# Patient Record
Sex: FEMALE | Race: ASIAN | URBAN - METROPOLITAN AREA
[De-identification: names, ages, dates, MRNs, and addresses within clinical notes are randomized per-mention and may not be internally consistent; named-entity substitution may affect disease eponyms.]

---

## 2020-09-09 LAB
ABO + RH BLD: NORMAL
ABO + RH BLD: NORMAL
BLD GP AB SCN SERPL QL: NORMAL
HBV SURFACE AG SERPL QL IA: NORMAL
HIV 1+2 AB+HIV1 P24 AG SERPL QL IA: NORMAL
RUBELLA ABY IGG: NORMAL
TREPONEMA ANTIBODIES: NORMAL

## 2021-03-29 ENCOUNTER — ANESTHESIA EVENT (OUTPATIENT)
Dept: OBGYN | Facility: CLINIC | Age: 31
End: 2021-03-29
Payer: COMMERCIAL

## 2021-03-29 ENCOUNTER — HOSPITAL ENCOUNTER (OUTPATIENT)
Facility: CLINIC | Age: 31
LOS: 1 days | Discharge: HOME | End: 2021-03-29
Attending: OBSTETRICS & GYNECOLOGY | Admitting: OBSTETRICS & GYNECOLOGY
Payer: COMMERCIAL

## 2021-03-29 ENCOUNTER — HOSPITAL ENCOUNTER (INPATIENT)
Facility: CLINIC | Age: 31
LOS: 3 days | Discharge: HOME HEALTH | End: 2021-04-01
Attending: OBSTETRICS & GYNECOLOGY | Admitting: OBSTETRICS & GYNECOLOGY
Payer: COMMERCIAL

## 2021-03-29 ENCOUNTER — ANESTHESIA (OUTPATIENT)
Dept: OBGYN | Facility: CLINIC | Age: 31
End: 2021-03-29
Payer: COMMERCIAL

## 2021-03-29 DIAGNOSIS — Z98.891 S/P C-SECTION: Primary | ICD-10-CM

## 2021-03-29 LAB
ABO + RH BLD: ABNORMAL
ABO + RH BLD: ABNORMAL
BLD GP AB INVEST PLASRBC-IMP: ABNORMAL
BLD GP AB SCN SERPL QL: ABNORMAL
BLOOD BANK CMNT PATIENT-IMP: ABNORMAL
BLOOD BANK CMNT PATIENT-IMP: ABNORMAL
BLOOD BANK CMNT PATIENT-IMP: NORMAL
HGB BLD-MCNC: 11.7 G/DL (ref 11.7–15.7)
LABORATORY COMMENT REPORT: NORMAL
SARS-COV-2 RNA RESP QL NAA+PROBE: NEGATIVE
SPECIMEN EXP DATE BLD: ABNORMAL
SPECIMEN SOURCE: NORMAL
T PALLIDUM AB SER QL: NONREACTIVE

## 2021-03-29 PROCEDURE — 250N000011 HC RX IP 250 OP 636: Performed by: NURSE ANESTHETIST, CERTIFIED REGISTERED

## 2021-03-29 PROCEDURE — 250N000009 HC RX 250: Performed by: OBSTETRICS & GYNECOLOGY

## 2021-03-29 PROCEDURE — 258N000003 HC RX IP 258 OP 636: Performed by: NURSE ANESTHETIST, CERTIFIED REGISTERED

## 2021-03-29 PROCEDURE — 250N000013 HC RX MED GY IP 250 OP 250 PS 637: Performed by: OBSTETRICS & GYNECOLOGY

## 2021-03-29 PROCEDURE — G0463 HOSPITAL OUTPT CLINIC VISIT: HCPCS | Mod: 25

## 2021-03-29 PROCEDURE — 86900 BLOOD TYPING SEROLOGIC ABO: CPT | Performed by: OBSTETRICS & GYNECOLOGY

## 2021-03-29 PROCEDURE — 85018 HEMOGLOBIN: CPT | Performed by: OBSTETRICS & GYNECOLOGY

## 2021-03-29 PROCEDURE — 59025 FETAL NON-STRESS TEST: CPT

## 2021-03-29 PROCEDURE — 250N000011 HC RX IP 250 OP 636: Performed by: OBSTETRICS & GYNECOLOGY

## 2021-03-29 PROCEDURE — 120N000012 HC R&B POSTPARTUM

## 2021-03-29 PROCEDURE — 258N000003 HC RX IP 258 OP 636: Performed by: OBSTETRICS & GYNECOLOGY

## 2021-03-29 PROCEDURE — 86901 BLOOD TYPING SEROLOGIC RH(D): CPT | Performed by: OBSTETRICS & GYNECOLOGY

## 2021-03-29 PROCEDURE — 86870 RBC ANTIBODY IDENTIFICATION: CPT | Performed by: OBSTETRICS & GYNECOLOGY

## 2021-03-29 PROCEDURE — 250N000009 HC RX 250: Performed by: NURSE ANESTHETIST, CERTIFIED REGISTERED

## 2021-03-29 PROCEDURE — 272N000001 HC OR GENERAL SUPPLY STERILE: Performed by: OBSTETRICS & GYNECOLOGY

## 2021-03-29 PROCEDURE — 360N000076 HC SURGERY LEVEL 3, PER MIN: Performed by: OBSTETRICS & GYNECOLOGY

## 2021-03-29 PROCEDURE — 86850 RBC ANTIBODY SCREEN: CPT | Performed by: OBSTETRICS & GYNECOLOGY

## 2021-03-29 PROCEDURE — 87635 SARS-COV-2 COVID-19 AMP PRB: CPT | Performed by: OBSTETRICS & GYNECOLOGY

## 2021-03-29 PROCEDURE — 36415 COLL VENOUS BLD VENIPUNCTURE: CPT | Performed by: OBSTETRICS & GYNECOLOGY

## 2021-03-29 PROCEDURE — 86780 TREPONEMA PALLIDUM: CPT | Performed by: OBSTETRICS & GYNECOLOGY

## 2021-03-29 PROCEDURE — 710N000010 HC RECOVERY PHASE 1, LEVEL 2, PER MIN: Performed by: OBSTETRICS & GYNECOLOGY

## 2021-03-29 PROCEDURE — 370N000017 HC ANESTHESIA TECHNICAL FEE, PER MIN: Performed by: OBSTETRICS & GYNECOLOGY

## 2021-03-29 RX ORDER — TRANEXAMIC ACID 10 MG/ML
1 INJECTION, SOLUTION INTRAVENOUS EVERY 30 MIN PRN
Status: DISCONTINUED | OUTPATIENT
Start: 2021-03-29 | End: 2021-04-01 | Stop reason: HOSPADM

## 2021-03-29 RX ORDER — OXYTOCIN/0.9 % SODIUM CHLORIDE 30/500 ML
100 PLASTIC BAG, INJECTION (ML) INTRAVENOUS CONTINUOUS
Status: DISCONTINUED | OUTPATIENT
Start: 2021-03-29 | End: 2021-04-01 | Stop reason: HOSPADM

## 2021-03-29 RX ORDER — SIMETHICONE 80 MG
80 TABLET,CHEWABLE ORAL 4 TIMES DAILY PRN
Status: DISCONTINUED | OUTPATIENT
Start: 2021-03-29 | End: 2021-04-01 | Stop reason: HOSPADM

## 2021-03-29 RX ORDER — KETOROLAC TROMETHAMINE 30 MG/ML
INJECTION, SOLUTION INTRAMUSCULAR; INTRAVENOUS PRN
Status: DISCONTINUED | OUTPATIENT
Start: 2021-03-29 | End: 2021-03-29

## 2021-03-29 RX ORDER — AZITHROMYCIN 500 MG/1
500 INJECTION, POWDER, LYOPHILIZED, FOR SOLUTION INTRAVENOUS
Status: COMPLETED | OUTPATIENT
Start: 2021-03-29 | End: 2021-03-29

## 2021-03-29 RX ORDER — ACETAMINOPHEN 325 MG/1
975 TABLET ORAL ONCE
Status: COMPLETED | OUTPATIENT
Start: 2021-03-29 | End: 2021-03-29

## 2021-03-29 RX ORDER — AMOXICILLIN 250 MG
1 CAPSULE ORAL 2 TIMES DAILY
Status: DISCONTINUED | OUTPATIENT
Start: 2021-03-29 | End: 2021-04-01 | Stop reason: HOSPADM

## 2021-03-29 RX ORDER — IBUPROFEN 400 MG/1
800 TABLET, FILM COATED ORAL EVERY 6 HOURS
Status: DISCONTINUED | OUTPATIENT
Start: 2021-03-30 | End: 2021-04-01 | Stop reason: HOSPADM

## 2021-03-29 RX ORDER — ONDANSETRON 2 MG/ML
INJECTION INTRAMUSCULAR; INTRAVENOUS PRN
Status: DISCONTINUED | OUTPATIENT
Start: 2021-03-29 | End: 2021-03-29

## 2021-03-29 RX ORDER — LEVOTHYROXINE SODIUM 137 UG/1
137 TABLET ORAL 2 TIMES DAILY
Status: DISCONTINUED | OUTPATIENT
Start: 2021-03-29 | End: 2021-03-29

## 2021-03-29 RX ORDER — FERROUS SULFATE 324(65)MG
TABLET, DELAYED RELEASE (ENTERIC COATED) ORAL
Status: ON HOLD | COMMUNITY
End: 2021-03-29

## 2021-03-29 RX ORDER — NALOXONE HYDROCHLORIDE 0.4 MG/ML
0.2 INJECTION, SOLUTION INTRAMUSCULAR; INTRAVENOUS; SUBCUTANEOUS
Status: DISCONTINUED | OUTPATIENT
Start: 2021-03-29 | End: 2021-04-01 | Stop reason: HOSPADM

## 2021-03-29 RX ORDER — FENTANYL CITRATE 50 UG/ML
25 INJECTION, SOLUTION INTRAMUSCULAR; INTRAVENOUS
Status: DISCONTINUED | OUTPATIENT
Start: 2021-03-29 | End: 2021-03-29 | Stop reason: HOSPADM

## 2021-03-29 RX ORDER — HYDROCORTISONE 2.5 %
CREAM (GRAM) TOPICAL 3 TIMES DAILY PRN
Status: DISCONTINUED | OUTPATIENT
Start: 2021-03-29 | End: 2021-04-01 | Stop reason: HOSPADM

## 2021-03-29 RX ORDER — AZITHROMYCIN 500 MG/1
INJECTION, POWDER, LYOPHILIZED, FOR SOLUTION INTRAVENOUS
Status: DISCONTINUED
Start: 2021-03-29 | End: 2021-03-29 | Stop reason: HOSPADM

## 2021-03-29 RX ORDER — CITRIC ACID/SODIUM CITRATE 334-500MG
30 SOLUTION, ORAL ORAL
Status: COMPLETED | OUTPATIENT
Start: 2021-03-29 | End: 2021-03-29

## 2021-03-29 RX ORDER — MODIFIED LANOLIN
OINTMENT (GRAM) TOPICAL
Status: DISCONTINUED | OUTPATIENT
Start: 2021-03-29 | End: 2021-04-01 | Stop reason: HOSPADM

## 2021-03-29 RX ORDER — SODIUM CHLORIDE, SODIUM LACTATE, POTASSIUM CHLORIDE, CALCIUM CHLORIDE 600; 310; 30; 20 MG/100ML; MG/100ML; MG/100ML; MG/100ML
INJECTION, SOLUTION INTRAVENOUS CONTINUOUS PRN
Status: DISCONTINUED | OUTPATIENT
Start: 2021-03-29 | End: 2021-03-29

## 2021-03-29 RX ORDER — METHYLERGONOVINE MALEATE 0.2 MG/ML
200 INJECTION INTRAVENOUS
Status: DISCONTINUED | OUTPATIENT
Start: 2021-03-29 | End: 2021-04-01 | Stop reason: HOSPADM

## 2021-03-29 RX ORDER — NALBUPHINE HYDROCHLORIDE 10 MG/ML
2.5-5 INJECTION, SOLUTION INTRAMUSCULAR; INTRAVENOUS; SUBCUTANEOUS EVERY 6 HOURS PRN
Status: DISCONTINUED | OUTPATIENT
Start: 2021-03-29 | End: 2021-04-01 | Stop reason: HOSPADM

## 2021-03-29 RX ORDER — SODIUM CHLORIDE, SODIUM LACTATE, POTASSIUM CHLORIDE, CALCIUM CHLORIDE 600; 310; 30; 20 MG/100ML; MG/100ML; MG/100ML; MG/100ML
INJECTION, SOLUTION INTRAVENOUS CONTINUOUS
Status: DISCONTINUED | OUTPATIENT
Start: 2021-03-29 | End: 2021-03-29 | Stop reason: HOSPADM

## 2021-03-29 RX ORDER — OXYTOCIN/0.9 % SODIUM CHLORIDE 30/500 ML
340 PLASTIC BAG, INJECTION (ML) INTRAVENOUS CONTINUOUS PRN
Status: DISCONTINUED | OUTPATIENT
Start: 2021-03-29 | End: 2021-04-01 | Stop reason: HOSPADM

## 2021-03-29 RX ORDER — LIDOCAINE 40 MG/G
CREAM TOPICAL
Status: DISCONTINUED | OUTPATIENT
Start: 2021-03-29 | End: 2021-04-01 | Stop reason: HOSPADM

## 2021-03-29 RX ORDER — LEVOTHYROXINE SODIUM 137 UG/1
137 TABLET ORAL DAILY
COMMUNITY

## 2021-03-29 RX ORDER — AMOXICILLIN 250 MG
2 CAPSULE ORAL 2 TIMES DAILY
Status: DISCONTINUED | OUTPATIENT
Start: 2021-03-29 | End: 2021-04-01 | Stop reason: HOSPADM

## 2021-03-29 RX ORDER — MISOPROSTOL 200 UG/1
800 TABLET ORAL
Status: DISCONTINUED | OUTPATIENT
Start: 2021-03-29 | End: 2021-04-01 | Stop reason: HOSPADM

## 2021-03-29 RX ORDER — ACETAMINOPHEN 325 MG/1
TABLET ORAL
Status: DISCONTINUED
Start: 2021-03-29 | End: 2021-03-29 | Stop reason: HOSPADM

## 2021-03-29 RX ORDER — ONDANSETRON 2 MG/ML
4 INJECTION INTRAMUSCULAR; INTRAVENOUS EVERY 6 HOURS PRN
Status: DISCONTINUED | OUTPATIENT
Start: 2021-03-29 | End: 2021-03-29 | Stop reason: HOSPADM

## 2021-03-29 RX ORDER — BUPIVACAINE HYDROCHLORIDE 7.5 MG/ML
INJECTION, SOLUTION INTRASPINAL PRN
Status: DISCONTINUED | OUTPATIENT
Start: 2021-03-29 | End: 2021-03-29

## 2021-03-29 RX ORDER — NALOXONE HYDROCHLORIDE 0.4 MG/ML
0.4 INJECTION, SOLUTION INTRAMUSCULAR; INTRAVENOUS; SUBCUTANEOUS
Status: DISCONTINUED | OUTPATIENT
Start: 2021-03-29 | End: 2021-04-01 | Stop reason: HOSPADM

## 2021-03-29 RX ORDER — CEFAZOLIN SODIUM 2 G/100ML
INJECTION, SOLUTION INTRAVENOUS
Status: DISCONTINUED
Start: 2021-03-29 | End: 2021-03-29 | Stop reason: HOSPADM

## 2021-03-29 RX ORDER — MORPHINE SULFATE 1 MG/ML
INJECTION, SOLUTION EPIDURAL; INTRATHECAL; INTRAVENOUS PRN
Status: DISCONTINUED | OUTPATIENT
Start: 2021-03-29 | End: 2021-03-29

## 2021-03-29 RX ORDER — KETOROLAC TROMETHAMINE 30 MG/ML
30 INJECTION, SOLUTION INTRAMUSCULAR; INTRAVENOUS EVERY 6 HOURS
Status: COMPLETED | OUTPATIENT
Start: 2021-03-29 | End: 2021-03-30

## 2021-03-29 RX ORDER — CEFAZOLIN SODIUM 2 G/100ML
2 INJECTION, SOLUTION INTRAVENOUS
Status: COMPLETED | OUTPATIENT
Start: 2021-03-29 | End: 2021-03-29

## 2021-03-29 RX ORDER — OXYCODONE HYDROCHLORIDE 5 MG/1
5 TABLET ORAL EVERY 4 HOURS PRN
Status: DISCONTINUED | OUTPATIENT
Start: 2021-03-29 | End: 2021-04-01 | Stop reason: HOSPADM

## 2021-03-29 RX ORDER — LIDOCAINE 40 MG/G
CREAM TOPICAL
Status: DISCONTINUED | OUTPATIENT
Start: 2021-03-29 | End: 2021-03-29

## 2021-03-29 RX ORDER — ACETAMINOPHEN 325 MG/1
975 TABLET ORAL EVERY 6 HOURS
Status: DISCONTINUED | OUTPATIENT
Start: 2021-03-29 | End: 2021-04-01 | Stop reason: HOSPADM

## 2021-03-29 RX ORDER — EPHEDRINE SULFATE 50 MG/ML
5 INJECTION, SOLUTION INTRAMUSCULAR; INTRAVENOUS; SUBCUTANEOUS
Status: DISCONTINUED | OUTPATIENT
Start: 2021-03-29 | End: 2021-04-01 | Stop reason: HOSPADM

## 2021-03-29 RX ORDER — ONDANSETRON 2 MG/ML
4 INJECTION INTRAMUSCULAR; INTRAVENOUS EVERY 6 HOURS PRN
Status: DISCONTINUED | OUTPATIENT
Start: 2021-03-29 | End: 2021-04-01 | Stop reason: HOSPADM

## 2021-03-29 RX ORDER — OXYTOCIN 10 [USP'U]/ML
10 INJECTION, SOLUTION INTRAMUSCULAR; INTRAVENOUS
Status: DISCONTINUED | OUTPATIENT
Start: 2021-03-29 | End: 2021-04-01 | Stop reason: HOSPADM

## 2021-03-29 RX ORDER — BISACODYL 10 MG
10 SUPPOSITORY, RECTAL RECTAL DAILY PRN
Status: DISCONTINUED | OUTPATIENT
Start: 2021-03-31 | End: 2021-04-01 | Stop reason: HOSPADM

## 2021-03-29 RX ORDER — OXYTOCIN/0.9 % SODIUM CHLORIDE 30/500 ML
PLASTIC BAG, INJECTION (ML) INTRAVENOUS CONTINUOUS PRN
Status: DISCONTINUED | OUTPATIENT
Start: 2021-03-29 | End: 2021-03-29

## 2021-03-29 RX ORDER — VITAMIN A ACETATE, .BETA.-CAROTENE, ASCORBIC ACID, CHOLECALCIFEROL, .ALPHA.-TOCOPHEROL ACETATE, DL-, THIAMINE MONONITRATE, RIBOFLAVIN, NIACINAMIDE, PYRIDOXINE HYDROCHLORIDE, FOLIC ACID, CYANOCOBALAMIN, CALCIUM CARBONATE, FERROUS FUMARATE, ZINC OXIDE, AND CUPRIC OXIDE 2000; 2000; 120; 400; 22; 1.84; 3; 20; 10; 1; 12; 200; 27; 25; 2 [IU]/1; [IU]/1; MG/1; [IU]/1; MG/1; MG/1; MG/1; MG/1; MG/1; MG/1; UG/1; MG/1; MG/1; MG/1; MG/1
1 TABLET ORAL DAILY
Status: ON HOLD | COMMUNITY
End: 2021-03-29

## 2021-03-29 RX ORDER — CITRIC ACID/SODIUM CITRATE 334-500MG
SOLUTION, ORAL ORAL
Status: DISCONTINUED
Start: 2021-03-29 | End: 2021-03-29 | Stop reason: HOSPADM

## 2021-03-29 RX ORDER — DEXTROSE, SODIUM CHLORIDE, SODIUM LACTATE, POTASSIUM CHLORIDE, AND CALCIUM CHLORIDE 5; .6; .31; .03; .02 G/100ML; G/100ML; G/100ML; G/100ML; G/100ML
INJECTION, SOLUTION INTRAVENOUS CONTINUOUS
Status: DISCONTINUED | OUTPATIENT
Start: 2021-03-29 | End: 2021-04-01 | Stop reason: HOSPADM

## 2021-03-29 RX ORDER — LEVOTHYROXINE SODIUM 137 UG/1
137 TABLET ORAL
Status: DISCONTINUED | OUTPATIENT
Start: 2021-03-29 | End: 2021-04-01 | Stop reason: HOSPADM

## 2021-03-29 RX ORDER — CEFAZOLIN SODIUM 1 G/3ML
1 INJECTION, POWDER, FOR SOLUTION INTRAMUSCULAR; INTRAVENOUS SEE ADMIN INSTRUCTIONS
Status: DISCONTINUED | OUTPATIENT
Start: 2021-03-29 | End: 2021-03-29 | Stop reason: HOSPADM

## 2021-03-29 RX ADMIN — SODIUM CHLORIDE, POTASSIUM CHLORIDE, SODIUM LACTATE AND CALCIUM CHLORIDE: 600; 310; 30; 20 INJECTION, SOLUTION INTRAVENOUS at 08:00

## 2021-03-29 RX ADMIN — MORPHINE SULFATE 0.1 MG: 1 INJECTION, SOLUTION EPIDURAL; INTRATHECAL; INTRAVENOUS at 09:21

## 2021-03-29 RX ADMIN — KETOROLAC TROMETHAMINE 30 MG: 30 INJECTION, SOLUTION INTRAMUSCULAR at 16:04

## 2021-03-29 RX ADMIN — SODIUM CHLORIDE, POTASSIUM CHLORIDE, SODIUM LACTATE AND CALCIUM CHLORIDE: 600; 310; 30; 20 INJECTION, SOLUTION INTRAVENOUS at 10:04

## 2021-03-29 RX ADMIN — PHENYLEPHRINE HYDROCHLORIDE 0.5 MCG/KG/MIN: 10 INJECTION INTRAVENOUS at 09:19

## 2021-03-29 RX ADMIN — ONDANSETRON 4 MG: 2 INJECTION INTRAMUSCULAR; INTRAVENOUS at 22:42

## 2021-03-29 RX ADMIN — ACETAMINOPHEN 975 MG: 325 TABLET, FILM COATED ORAL at 14:10

## 2021-03-29 RX ADMIN — SODIUM CITRATE AND CITRIC ACID MONOHYDRATE 30 ML: 500; 334 SOLUTION ORAL at 09:11

## 2021-03-29 RX ADMIN — BUPIVACAINE HYDROCHLORIDE IN DEXTROSE 10.5 MG: 7.5 INJECTION, SOLUTION SUBARACHNOID at 09:21

## 2021-03-29 RX ADMIN — CEFAZOLIN SODIUM 2 G: 2 INJECTION, SOLUTION INTRAVENOUS at 09:32

## 2021-03-29 RX ADMIN — PHENYLEPHRINE HYDROCHLORIDE 100 MCG: 10 INJECTION INTRAVENOUS at 09:20

## 2021-03-29 RX ADMIN — ONDANSETRON 4 MG: 2 INJECTION INTRAMUSCULAR; INTRAVENOUS at 16:03

## 2021-03-29 RX ADMIN — AZITHROMYCIN MONOHYDRATE 500 MG: 500 INJECTION, POWDER, LYOPHILIZED, FOR SOLUTION INTRAVENOUS at 09:30

## 2021-03-29 RX ADMIN — ACETAMINOPHEN 975 MG: 325 TABLET, FILM COATED ORAL at 08:21

## 2021-03-29 RX ADMIN — KETOROLAC TROMETHAMINE 30 MG: 30 INJECTION, SOLUTION INTRAMUSCULAR at 10:05

## 2021-03-29 RX ADMIN — ONDANSETRON 4 MG: 2 INJECTION INTRAMUSCULAR; INTRAVENOUS at 09:09

## 2021-03-29 RX ADMIN — SODIUM CHLORIDE, SODIUM LACTATE, POTASSIUM CHLORIDE, CALCIUM CHLORIDE AND DEXTROSE MONOHYDRATE: 5; 600; 310; 30; 20 INJECTION, SOLUTION INTRAVENOUS at 19:59

## 2021-03-29 RX ADMIN — SENNOSIDES AND DOCUSATE SODIUM 1 TABLET: 8.6; 5 TABLET ORAL at 14:09

## 2021-03-29 RX ADMIN — KETOROLAC TROMETHAMINE 30 MG: 30 INJECTION, SOLUTION INTRAMUSCULAR at 21:54

## 2021-03-29 RX ADMIN — ACETAMINOPHEN 975 MG: 325 TABLET, FILM COATED ORAL at 19:58

## 2021-03-29 RX ADMIN — Medication 100 ML/HR: at 14:07

## 2021-03-29 RX ADMIN — Medication 340 ML/HR: at 09:39

## 2021-03-29 RX ADMIN — SENNOSIDES AND DOCUSATE SODIUM 1 TABLET: 8.6; 5 TABLET ORAL at 19:58

## 2021-03-29 RX ADMIN — SODIUM CHLORIDE, POTASSIUM CHLORIDE, SODIUM LACTATE AND CALCIUM CHLORIDE: 600; 310; 30; 20 INJECTION, SOLUTION INTRAVENOUS at 09:14

## 2021-03-29 ASSESSMENT — MIFFLIN-ST. JEOR: SCORE: 1470.65

## 2021-03-29 ASSESSMENT — ENCOUNTER SYMPTOMS: SEIZURES: 0

## 2021-03-29 ASSESSMENT — LIFESTYLE VARIABLES: TOBACCO_USE: 0

## 2021-03-29 NOTE — ANESTHESIA PREPROCEDURE EVALUATION
Anesthesia Pre-Procedure Evaluation    Patient: Valerie Burgess   MRN: [id number] : 1990        Preoperative Diagnosis: Previous  delivery affecting pregnancy, antepartum [O34.219]   Procedure : Procedure(s):  REPEAT  SECTION     Past Medical History:   Diagnosis Date     Thyroid disease       Past Surgical History:   Procedure Laterality Date     HERNIA REPAIR        No Known Allergies   Social History     Tobacco Use     Smoking status: Never Smoker     Smokeless tobacco: Never Used   Substance Use Topics     Alcohol use: Not Currently      Wt Readings from Last 1 Encounters:   21 77.6 kg (171 lb)        Anesthesia Evaluation   Pt has not had prior anesthetic         ROS/MED HX  ENT/Pulmonary:    (-) tobacco use, asthma and sleep apnea   Neurologic:    (-) no seizures and no CVA   Cardiovascular:       METS/Exercise Tolerance:     Hematologic:       Musculoskeletal:       GI/Hepatic:    (-) GERD   Renal/Genitourinary:       Endo:     (+) thyroid problem,     Psychiatric/Substance Use:       Infectious Disease:       Malignancy:       Other:            Physical Exam    Airway        Mallampati: II   TM distance: > 3 FB   Neck ROM: full   Mouth opening: > 3 cm    Respiratory Devices and Support         Dental  no notable dental history         Cardiovascular   cardiovascular exam normal          Pulmonary   pulmonary exam normal                OUTSIDE LABS:  CBC: No results found for: WBC, HGB, HCT, PLT  BMP: No results found for: NA, POTASSIUM, CHLORIDE, CO2, BUN, CR, GLC  COAGS: No results found for: PTT, INR, FIBR  POC: No results found for: BGM, HCG, HCGS  HEPATIC: No results found for: ALBUMIN, PROTTOTAL, ALT, AST, GGT, ALKPHOS, BILITOTAL, BILIDIRECT, EUGENIE  OTHER: No results found for: PH, LACT, A1C, SHARI, PHOS, MAG, LIPASE, AMYLASE, TSH, T4, T3, CRP, SED    Anesthesia Plan    ASA Status:  2      Anesthesia Type: Spinal.              Consents    Anesthesia Plan(s) and associated  risks, benefits, and realistic alternatives discussed. Questions answered and patient/representative(s) expressed understanding.     - Discussed with:  Patient    Use of blood products discussed: Yes.     Postoperative Care    Pain management: intrathecal morphine, IV analgesics.   PONV prophylaxis: Ondansetron (or other 5HT-3)     Comments:         H&P reviewed: Unable to attach H&P to encounter due to EHR limitations. H&P Update: appropriate H&P reviewed, patient examined. No interval changes since H&P (within 30 days).         Kayy Issa

## 2021-03-29 NOTE — PLAN OF CARE
Vital signs are WNL and patient was able to take oral meds.  Bowel sounds present but hypo.  Lung sounds clear.  She rates pain as a 4/10 and describes it as pressure.  She and spouse are working on infant cares and breastfeeding.

## 2021-03-29 NOTE — ANESTHESIA POSTPROCEDURE EVALUATION
Patient: Valerie Burgess    Procedure(s):  REPEAT  SECTION    Diagnosis:Previous  delivery affecting pregnancy, antepartum [O34.219]  Diagnosis Additional Information: No value filed.    Anesthesia Type:  Spinal    Note:  Disposition: Inpatient   Postop Pain Control: Uneventful            Sign Out: Well controlled pain   PONV: No   Neuro/Psych: Uneventful            Sign Out: Acceptable/Baseline neuro status   Airway/Respiratory: Uneventful            Sign Out: Acceptable/Baseline resp. status   CV/Hemodynamics: Uneventful            Sign Out: Acceptable CV status   Other NRE: NONE   DID A NON-ROUTINE EVENT OCCUR?          Last vitals:  Vitals:    21 1100 21 1115 21 1130   BP: 101/66 91/55 93/64   Pulse: 71 79 70   Resp: 14 14 14   Temp:  36.3  C (97.3  F)    SpO2: 97% 97% 99%       Last vitals prior to Anesthesia Care Transfer:  CRNA VITALS  3/29/2021 0934 - 3/29/2021 1034      3/29/2021             Resp Rate (set):  10          Electronically Signed By: Kayy Issa  2021  11:48 AM

## 2021-03-29 NOTE — PLAN OF CARE
Data: [facility] with c/o labor ctx every 5 minutes.   Patient is a . Prenatal record reviewed.      OB History    Para Term  AB Living   2 1 0 0 0 1   SAB TAB Ectopic Multiple Live Births   0 0 0 0 1      # Outcome Date GA Lbr Nikhil/2nd Weight Sex Delivery Anes PTL Lv   2 Current            1 Para 16     CS-LTranv   YARA      Medical History:   Past Medical History:   Diagnosis Date     Thyroid disease      Gestational Age 37w0d. VSS. Cervix: posterior, closed and effaced 50-70%.  Fetal movement present. Patient denies cramping, backache, vaginal discharge, pelvic pressure, UTI symptoms, GI problems, bloody show, vaginal bleeding, edema, headache, visual disturbances, epigastric or URQ pain, abdominal pain, rupture of membranes. Support person present.    Action: Verbal consent for EFM. Triage assessment completed. EFM applied. Fetal assessment: Presumed adequate fetal oxygenation documented (see flow record).  Patient instructed to report change in fetal movement, vaginal leaking of fluid or bleeding, abdominal pain, or any concerns related to the pregnancy to her nurse/physician.     Response: Dr. Aguila informed of SVE, FHR strip reviewed. Plan per provider is Discharge home. Patient verbalized understanding of education and verbalized agreement with plan. Discharged ambulatory at 0352.

## 2021-03-29 NOTE — H&P
"[facility]   LABOR ADMIT    Valerie Burgess MRN# [id number]  Age: 31 year old YOB: 1990    Date of Admission: 3/29/2021    Primary care provider: No primary care provider on file.     HPI: This is a 31 year old  at 36w4d presenting in labor. Her pregnancy has been overall uncomplicated. She is on synthroid for hypothyroidism. She had low risk results of NIPT. She has a history of  in her first pregnancy. Also with history of an umbilical hernia repair with mesh.   She began neyda overnight. Initially her cervix was closed upon presentation to [facility].     Obstetrical History:  H/o term  due to NRFHT in setting of triple I when 6 cm    Past Medical History:  Hypothyroidism on synthroid     Past Surgical History:       Social History:  This patient has no significant social history     Family History:  This patient has no significant family history     Allergies:  All allergies reviewed and addressed    Physical Exam:  /69   Pulse 81   Temp 97.8  F (36.6  C) (Temporal)   Resp 16   Ht 1.626 m (5' 4\")   Wt 77.6 kg (171 lb)   BMI 29.35 kg/m    Gen: NAD  Abd: soft, NT, ND, gravid  Ext: NT, nonedematous  SVE: 3 cm around 0730 - change from closed at 0330    Cat I FHT  Neyda q3-5 mins    Assessment:  This is a 31 year old  at 36+4 weeks admitted for RLTCS due to labor     Plan:  Admitted to periop  preop abx  Reviewed risks of  including bleeding, infection, injury to surrounding structures, need for reoperation, need for blood transfusion. She consented to RLTCS and also signed blood transfusion consent.   Reviewed that baby is a few days  (pt initially incorrectly reported her due date as , which was her scheduled RLTCS date, but her CAROELE is  based on first trimester ultrasound c/w LMP) but that in the setting of labor at this point, delivery is recommended. Advised babies generally do well at this age.  Routine " postpartum care  Continue synthroid       Heavenly Aguila MD  3/29/2021 3898

## 2021-03-29 NOTE — ANESTHESIA PROCEDURE NOTES
Intrathecal injection Procedure Note  Pre-Procedure   Staff -        Anesthesiologist:  Kayy Issa       Performed By: anesthesiologist       Location: OB       Pre-Anesthestic Checklist: patient identified, IV checked, risks and benefits discussed, informed consent, monitors and equipment checked, pre-op evaluation, at physician/surgeon's request and post-op pain management  Timeout:       Correct Patient: Yes        Correct Procedure: Yes        Correct Site: Yes        Correct Position: Yes   Procedure Documentation  Procedure: intrathecal injection       Patient Position: sitting       Skin prep: Betadine       Insertion Site: L3-4. (midline approach).       Needle Gauge: 25.        Needle Length (Inches): 3        Spinal Needle Type: Quincke       Introducer used      # of attempts: 1 and  # of redirects:     Assessment/Narrative         Paresthesias: No.       CSF fluid: clear.      Opening pressure was cmH2O while  Sitting.      Comments:  duramorph 100mcg pf  bupivicaine 0.75% hyperbaric, 10.5mg

## 2021-03-29 NOTE — LACTATION NOTE
Visited with family for initial lactation visit. Parents report infant has been attempting to breast feed and then supplementing with formula via bottle. Infant spitty at times. Attempted to help infant latch on right breast with nipple shield; infant too sleepy to latch. Mother able to hand express drops of colostrum. Discussed normal  feeding patterns for LPT infant. Discussed that it may take infant up until her due date to take full feedings at breast. Discussed importance of offering breast when cueing, waiting no longer than 3 hours in between feedings. Recommended benefit of continuing supplement until mature milk is in and outpatient visits determine infant is transferring adequate volumes. Encouraged outpatient lactation visit after mature milk in to evaluate progress. Current feeding plan:  Offer breast when infant cueing/at least every 3 hours. May use nipple shield to help with latch; if no latch after 10 minutes, move on to supplement.   Supplement infant after breastfeeding/attempt: Parents choose to offer formula supplement via bottle.  Mother to pump after every breastfeeding session/attempt.   Explained benefits of holding baby skin to skin often. Will revisit as needed.

## 2021-03-29 NOTE — ANESTHESIA CARE TRANSFER NOTE
Patient: Valerie Burgess    Procedure(s):  REPEAT  SECTION    Diagnosis: Previous  delivery affecting pregnancy, antepartum [O34.219]  Diagnosis Additional Information: No value filed.    Anesthesia Type:   Spinal     Note:    Oropharynx: oropharynx clear of all foreign objects and spontaneously breathing  Level of Consciousness: awake  Oxygen Supplementation: room air    Independent Airway: airway patency satisfactory and stable  Dentition: dentition unchanged  Vital Signs Stable: post-procedure vital signs reviewed and stable  Report to RN Given: handoff report given  Patient transferred to: PACU  Comments: Pt awake and able to verbalize needs. VSS. All monitors on and audible. Spontaneous respiration without difficulty. o2 sats 98% on RA. Pt denies pain. Report given to PACU RN.  Handoff Report: Identifed the Patient, Identified the Reponsible Provider, Reviewed the pertinent medical history, Discussed the surgical course, Reviewed Intra-OP anesthesia mangement and issues during anesthesia, Set expectations for post-procedure period and Allowed opportunity for questions and acknowledgement of understanding      Vitals: (Last set prior to Anesthesia Care Transfer)  CRNA VITALS  3/29/2021 0934 - 3/29/2021 1010      3/29/2021             Resp Rate (set):  10        Electronically Signed By: LAURA Gaitan CRNA  2021  10:10 AM

## 2021-03-29 NOTE — OP NOTE
OB  Delivery Note    Valerie Burgess MRN# [id number]   Age: 31 year old YOB: 1990     Pre-operative Diagnosis:   1. IUP at 36w4d   2. History of  section  3. Desires repeat  section  4.   5.  labor    Post-operative Diagnosis: Same    Procedure done: RLS    Surgeon: Heavenly Aguila MD     Assist: LOUISE Perry    Anesthesia: spinal    Complications:  None    QBL: 315 mL    UOP: 450 mL     IV fluids: 1000 mL    Drains: reynolds catheter    Findings:  Viable female infant in vertex position weighing 6 lbs 0 oz with Apgars of 8/8  3-V cord  clear amniotic fluid  Normal-appearing placenta  Normal tubes and ovaries bilaterally.    Specimens:  none    Complications:  none      Indication and Consent:  This is a 31 year old  admitted at 36w4d for  labor. She has a history of  and desires repeat . The risks of  section including bleeding, infection, injury to surrounding structures, injury to the baby, need for reoperation, need for blood transfusion were discussed with the patient. She expressed understanding and consented to proceed with RLTCS     Procedure Details:    The patient was brought to the operating room with IV fluids running. IV antibiotics were given for infection prophylaxis. PCBs were placed on the lower extremities and found to be working prior to onset of the case. Spinal anesthesia was administered and found to be adequate. A Reynolds catheter was placed to gravity.    The patient was prepped and draped in the dorsal supine position with a leftward tilt. A timeout was performed to identify the patient and procedure.    A Pfannenstiel incision was made at the level of the prior incisional scar with the scalpel. It was carried down through the subcutaneous tissue to the fascia with the scalpel and the Bovie.     The fascia was incised with the scalpel and the fascial incision was extended laterally with the Vee  scissors. The superior aspect of the fascia was grasped with Kocher clamps and  off the underlying musculature with fingertips bluntly laterally and with Vee scissors down the midline. The inferior aspect of the fascia was similarly grasped and dissected off the underlying musculature.  Preperitoneal fatty tissue was  off with fingertips until the peritoneum could be entered bluntly with fingertips. The peritoneal incision was extended laterally with blunt traction with careful visualization of the bladder.  The bladder blade was inserted to keep the bladder out of the operative field.     A bladder flap was developed with use of the Metzenbaum scissors. The bladder blade was repositioned to keep the bladder out of the operative field.     The uterus was palpated and felt to be midline. The scalpel was used to make a transverse incision in the lower uterine segment with care to avoid the bladder. The incision was entered bluntly with fingertips and extended laterally with cephalo-caudad traction.     The infant was found to be in vertex presentation. The head of the fetus was elevated to the incision. Fundal pressure was applied from above and the infant delivered without difficulty. The cord was clamped and cut and the infant after one minute and was passed off to the pediatrics team. The placenta was delivered with manual traction.     The uterus was then exteriorized. The inside of the uterus was wiped with a lap sponge to ensure removal of placental membranes. The hysterotomy was closed with 0 Vicryl in a running locked fashion. Hemostasis was achieved.     The bladder blade was removed. The uterus was replaced in the pelvis. The bladder blade was replaced. Lap sponges were used to remove blood and clot from the pelvic gutters. The hysterotomy was again visualized and found to have good hemostasis. The bladder blade was removed.     The fascia was closed with running sutures of 0 Vicryl. The  incision was irrigated with warm normal saline. The skin was closed in a subcuticular fashion with 4-0 Monocryl.    The patient tolerated the procedure well. All counts were correct x2. The patient and the baby were returned to the recovery room in a stable condition.            Heavenly Aguila MD  3/29/2021 0957

## 2021-03-29 NOTE — PLAN OF CARE
Data: Valerie Burgess transferred to 435 via bed at 1215. Baby transferred via parent's arms.  Action: Receiving unit notified of transfer: Yes. Patient and family notified of room change. Report given to Brittany JONES RN at 1215. Belongings sent to receiving unit. Accompanied by Registered Nurse. Oriented patient to surroundings. Call light within reach. ID bands double-checked with receiving RN.  Response: Patient tolerated transfer and is stable.

## 2021-03-29 NOTE — DISCHARGE INSTRUCTIONS
Discharge Instruction for Undelivered Patients      You were seen for: Labor Assessment  We Consulted: Dr. Aguila  You had (Test or Medicine): NST     Diet:   You may eat meals and snacks.     Activity:  Call your doctor or nurse midwife if your baby is moving less than usual.     Call your provider if you notice:  Swelling in your face or increased swelling in your hands or legs.  Headaches that are not relieved by Tylenol (acetaminophen).  Changes in your vision (blurring: seeing spots or stars.)  Nausea (sick to your stomach) and vomiting (throwing up).   Weight gain of 5 pounds or more per week.  Heartburn that doesn't go away.  Signs of bladder infection: pain when you urinate (use the toilet), need to go more often and more urgently.  The bag of gallagher (rupture of membranes) breaks, or you notice leaking in your underwear.  Bright red blood in your underwear.  Abdominal (lower belly) or stomach pain.  Second (plus) baby: Contractions (tightening) less than 10 minutes apart and getting stronger.      Follow-up:  As scheduled in the clinic

## 2021-03-29 NOTE — PROVIDER NOTIFICATION
Nurse called  To ask for a Rhogam Study order since patient is B-.  There was also an error on her MAR stating that pt gets synthroid BID instead of one daily.  Dr. Aguila stated she will change order.

## 2021-03-30 LAB
HGB BLD-MCNC: 9.9 G/DL (ref 11.7–15.7)
HGB F BLD QL KLEIH BETKE: NORMAL

## 2021-03-30 PROCEDURE — 85018 HEMOGLOBIN: CPT | Performed by: OBSTETRICS & GYNECOLOGY

## 2021-03-30 PROCEDURE — 250N000013 HC RX MED GY IP 250 OP 250 PS 637: Performed by: OBSTETRICS & GYNECOLOGY

## 2021-03-30 PROCEDURE — 250N000011 HC RX IP 250 OP 636: Performed by: OBSTETRICS & GYNECOLOGY

## 2021-03-30 PROCEDURE — 36415 COLL VENOUS BLD VENIPUNCTURE: CPT | Performed by: OBSTETRICS & GYNECOLOGY

## 2021-03-30 PROCEDURE — 85460 HEMOGLOBIN FETAL: CPT | Performed by: OBSTETRICS & GYNECOLOGY

## 2021-03-30 PROCEDURE — 86900 BLOOD TYPING SEROLOGIC ABO: CPT | Performed by: OBSTETRICS & GYNECOLOGY

## 2021-03-30 PROCEDURE — 85461 HEMOGLOBIN FETAL: CPT | Performed by: OBSTETRICS & GYNECOLOGY

## 2021-03-30 PROCEDURE — 120N000012 HC R&B POSTPARTUM

## 2021-03-30 PROCEDURE — 86901 BLOOD TYPING SEROLOGIC RH(D): CPT | Performed by: OBSTETRICS & GYNECOLOGY

## 2021-03-30 RX ADMIN — ACETAMINOPHEN 975 MG: 325 TABLET, FILM COATED ORAL at 14:11

## 2021-03-30 RX ADMIN — HUMAN RHO(D) IMMUNE GLOBULIN 600 MCG: 300 INJECTION, SOLUTION INTRAMUSCULAR at 20:16

## 2021-03-30 RX ADMIN — IBUPROFEN 800 MG: 400 TABLET ORAL at 17:12

## 2021-03-30 RX ADMIN — LEVOTHYROXINE SODIUM 137 MCG: 137 TABLET ORAL at 08:12

## 2021-03-30 RX ADMIN — IBUPROFEN 800 MG: 400 TABLET ORAL at 23:27

## 2021-03-30 RX ADMIN — ACETAMINOPHEN 975 MG: 325 TABLET, FILM COATED ORAL at 20:13

## 2021-03-30 RX ADMIN — OXYCODONE HYDROCHLORIDE 5 MG: 5 TABLET ORAL at 20:13

## 2021-03-30 RX ADMIN — IBUPROFEN 800 MG: 400 TABLET ORAL at 10:54

## 2021-03-30 RX ADMIN — ACETAMINOPHEN 975 MG: 325 TABLET, FILM COATED ORAL at 08:12

## 2021-03-30 RX ADMIN — KETOROLAC TROMETHAMINE 30 MG: 30 INJECTION, SOLUTION INTRAMUSCULAR at 04:44

## 2021-03-30 RX ADMIN — SENNOSIDES AND DOCUSATE SODIUM 1 TABLET: 8.6; 5 TABLET ORAL at 08:11

## 2021-03-30 RX ADMIN — ACETAMINOPHEN 975 MG: 325 TABLET, FILM COATED ORAL at 01:42

## 2021-03-30 RX ADMIN — SENNOSIDES AND DOCUSATE SODIUM 1 TABLET: 8.6; 5 TABLET ORAL at 20:13

## 2021-03-30 RX ADMIN — OXYCODONE HYDROCHLORIDE 5 MG: 5 TABLET ORAL at 15:17

## 2021-03-30 NOTE — PROGRESS NOTES
[facility] Obstetrics Post-Op / Progress Note         Assessment and Plan:    Assessment:   Post-operative day #1  Low transverse repeat  section  L&D complications: None      Doing well.      Plan:   Ambulation encouraged; routine postop care           Interval History:   Doing well.  Pain is well-controlled.  No fevers.  No history of wound drainage, warmth or significant erythema.  Good appetite.  Denies chest pain, shortness of breath, nausea or vomiting.  Ambulatory.  Breastfeeding well.          Significant Problems:    None          Review of Systems:    The patient denies any chest pain, shortness of breath, excessive pain, fever, chills, purulent drainage from the wound, nausea or vomiting.          Medications:     All medications related to the patient's surgery have been reviewed  Current Facility-Administered Medications   Medication     acetaminophen (TYLENOL) tablet 975 mg     [START ON 3/31/2021] bisacodyl (DULCOLAX) Suppository 10 mg     Blood Bank will determine if patient is eligible for and the proper dosage of Rho (D) immune globulin (RhoGam)     dextrose 5% in lactated ringers infusion     ePHEDrine injection 5 mg     hydrocortisone 2.5 % cream     ibuprofen (ADVIL/MOTRIN) tablet 800 mg     lactated ringers BOLUS 1,000 mL     lactated ringers BOLUS 250 mL     lanolin cream     levothyroxine (SYNTHROID/LEVOTHROID) tablet 137 mcg     lidocaine (LMX4) cream     lidocaine 1 % 0.1-1 mL     medication instruction     methylergonovine (METHERGINE) injection 200 mcg     misoprostol (CYTOTEC) tablet 800 mcg     nalbuphine (NUBAIN) injection 2.5-5 mg     naloxone (NARCAN) injection 0.2 mg     naloxone (NARCAN) injection 0.2 mg     naloxone (NARCAN) injection 0.4 mg     naloxone (NARCAN) injection 0.4 mg     No MMR Needed -  Assessment: Patient does not need MMR vaccine     NO Rho (D) immune globulin (RhoGam) needed - mother Rh NEGATIVE - NOT Clinically indicated at this time      NO Rho (D) immune globulin (RhoGam) needed - mother Rh POSITIVE     No Tdap Needed - Assessment: Patient does not need Tdap vaccine     ondansetron (ZOFRAN) injection 4 mg     ondansetron (ZOFRAN) injection 4 mg     Opioid plan postpartum - medication instruction     oxyCODONE (ROXICODONE) tablet 5 mg     oxytocin (PITOCIN) 30 units in 500 mL 0.9% NaCl infusion     oxytocin (PITOCIN) 30 units in 500 mL 0.9% NaCl infusion     oxytocin (PITOCIN) injection 10 Units     senna-docusate (SENOKOT-S/PERICOLACE) 8.6-50 MG per tablet 1 tablet    Or     senna-docusate (SENOKOT-S/PERICOLACE) 8.6-50 MG per tablet 2 tablet     simethicone (MYLICON) chewable tablet 80 mg     sodium chloride (PF) 0.9% PF flush 3 mL     sodium chloride (PF) 0.9% PF flush 3 mL     [START ON 3/31/2021] sodium phosphate (FLEET ENEMA) 1 enema     tranexamic acid 1 g in 100 mL 0.7% NaCl IV bag (premix)             Physical Exam:     All vitals stable  Patient Vitals for the past 8 hrs:   BP Temp Temp src Pulse Resp SpO2   03/30/21 0440 104/66 97.8  F (36.6  C) Oral 86 16 99 %   03/30/21 0142 101/59 97.5  F (36.4  C) Oral 75 16 99 %     Wound clean and dry with minimal or no drainage.  Surrounding skin with minimal erythema.          Data:     All laboratory data related to this surgery reviewed  Hemoglobin   Date Value Ref Range Status   03/29/2021 11.7 11.7 - 15.7 g/dL Final     No imaging studies have been ordered    Otilia Alvarez MD

## 2021-03-30 NOTE — PLAN OF CARE
Vital signs stable,voiding with out difficulty,pain control with tylenol,ibuprofen&oxycodone,incision intact with steri strips,using ice&abdominal binder for comfort,baby breast feeding with nipple shield,pumping,getting drops of colostrum.Will continue to monitor.

## 2021-03-30 NOTE — LACTATION NOTE
This note was copied from a baby's chart.  Follow up lactation visit for LPT infant. Infant breastfeeding on right breast with nipple shield. Latched well. Reviewed benefit of using nipple shield for LPT infant. Discussed normal care of LPT, importance of feeding at least every 3 hours. Recommended breastfeeding first, then offering bottle supplement and pumping. Stressed the importance of pumping after feedings to establish adequate milk supply if the patient's goal is exclusive breastfeeding. Family receptive to information. Will follow as needed.

## 2021-03-30 NOTE — PLAN OF CARE
Vital signs stable. Postpartum assessment WDL. Uterine fundus is firm and midline. Pain well controlled with Tordal and Tylenol. Pt is now diuresing. Urine output is adequate. IV saline locked. Up with 1 person assist. Pt reports no dizziness when ambulating. Dressing marked, no change since beginning of shift. Breastfeeding with nipple shield. Pumping occasionally, supplementing with EBM and bottled Sim. Questions/concerns addressed.

## 2021-03-30 NOTE — PLAN OF CARE
"Vital signs stable, assessment WNL. Dressing clean, dry, and intact. Pain controlled with scheduled Toradol and Tylenol; states satisfaction with pain management. Stood at bedside and verbalized was \"very dizzy\" and \"drowsy\". Attempted Suzan Steady, and patient continued to report \"a lot of dizziness.\" Bladder scanned patient for 298, straight cath for 450. Working on breastfeeding, using a nipple shield, pumping after feedings. Encouraged to call RN with needs. Will continue to monitor.   "

## 2021-03-31 LAB
ABO + RH BLD: ABNORMAL
ABO + RH BLD: ABNORMAL
BLOOD BANK CMNT PATIENT-IMP: ABNORMAL
DATE RH IMM GL GVN: ABNORMAL
FETAL CELL SCN BLD QL ROSETTE: ABNORMAL
RH IG VIALS RECOM PATIENT: ABNORMAL

## 2021-03-31 PROCEDURE — 120N000012 HC R&B POSTPARTUM

## 2021-03-31 PROCEDURE — 250N000013 HC RX MED GY IP 250 OP 250 PS 637: Performed by: OBSTETRICS & GYNECOLOGY

## 2021-03-31 RX ADMIN — ACETAMINOPHEN 975 MG: 325 TABLET, FILM COATED ORAL at 20:29

## 2021-03-31 RX ADMIN — OXYCODONE HYDROCHLORIDE 5 MG: 5 TABLET ORAL at 05:49

## 2021-03-31 RX ADMIN — ACETAMINOPHEN 975 MG: 325 TABLET, FILM COATED ORAL at 08:32

## 2021-03-31 RX ADMIN — SENNOSIDES AND DOCUSATE SODIUM 1 TABLET: 8.6; 5 TABLET ORAL at 08:32

## 2021-03-31 RX ADMIN — IBUPROFEN 800 MG: 400 TABLET ORAL at 23:42

## 2021-03-31 RX ADMIN — SIMETHICONE 80 MG: 80 TABLET, CHEWABLE ORAL at 13:16

## 2021-03-31 RX ADMIN — IBUPROFEN 800 MG: 400 TABLET ORAL at 11:36

## 2021-03-31 RX ADMIN — ACETAMINOPHEN 975 MG: 325 TABLET, FILM COATED ORAL at 01:57

## 2021-03-31 RX ADMIN — IBUPROFEN 800 MG: 400 TABLET ORAL at 05:50

## 2021-03-31 RX ADMIN — SENNOSIDES AND DOCUSATE SODIUM 1 TABLET: 8.6; 5 TABLET ORAL at 20:29

## 2021-03-31 RX ADMIN — ACETAMINOPHEN 975 MG: 325 TABLET, FILM COATED ORAL at 14:33

## 2021-03-31 RX ADMIN — OXYCODONE HYDROCHLORIDE 5 MG: 5 TABLET ORAL at 01:57

## 2021-03-31 RX ADMIN — OXYCODONE HYDROCHLORIDE 5 MG: 5 TABLET ORAL at 13:15

## 2021-03-31 RX ADMIN — IBUPROFEN 800 MG: 400 TABLET ORAL at 17:05

## 2021-03-31 RX ADMIN — SIMETHICONE 80 MG: 80 TABLET, CHEWABLE ORAL at 20:29

## 2021-03-31 RX ADMIN — OXYCODONE HYDROCHLORIDE 5 MG: 5 TABLET ORAL at 17:05

## 2021-03-31 RX ADMIN — LEVOTHYROXINE SODIUM 137 MCG: 137 TABLET ORAL at 05:50

## 2021-03-31 NOTE — PLAN OF CARE
Vital signs stable,pain control with tylenol,ibuprofen&oxycodone,c/o low back pain using aqua k pad,incision intact with steri strips,voiding wit out difficulty,baby breast feeding with nipple shield,mom pumping supplementing EBM&formula via bottle.Will continue to monitor.post lidia depression score-10, consult ordered.

## 2021-03-31 NOTE — PLAN OF CARE
Vital signs stable.Fundus firm, midline at umbilicus withy scant flow. Incision well approximated, adhesive strips  intact. Voiding without difficulty. Lung sounds clear and equal. Using tylenol, ibuprofen and oxycodone for pain management. Up ambulating free of dizziness. Working on breastfeeding every 2-3 hours with nipple shield and supplementing with similac, pumping after breastfeed attempts. Encouraged to call with questions/concerns. Will continue to monitor.

## 2021-03-31 NOTE — PROGRESS NOTES
"Valerie Aditya  2021   POD2 s/p RLTCS    S: 31 year old  delivered at 36w4d   Doing well without complaints.  Sore but medication helping.   Lochia moderate.   Ambulating.  Urinating without issues.  Passing flatus. +BM.  Breastfeeding/pumping and supplementing with formula.    O: /60 (BP Location: Left arm)   Pulse 70   Temp 97.8  F (36.6  C) (Axillary)   Resp 18   Ht 1.626 m (5' 4\")   Wt 77.6 kg (171 lb)   LMP 2020   SpO2 99%   Breastfeeding Unknown   BMI 29.35 kg/m    Gen: NAD, sitting up in chair breastfeeding  Abd exam deferred given positioning in chair with boppy and infant in order to support breastfeeding  Ext: NT, nonedematous    Hemoglobin   Date Value Ref Range Status   2021 9.9 (L) 11.7 - 15.7 g/dL Final   ]    A/P:  31 year old  POD2 s/p RLTCS after presenting in labor at 36+4  - ibuprofen,Tylenol, and oxycodone PRN pain  - support breastfeeding - she is very concerned that she is pumping only little bits, discussed that this is expected with colostrum and that true milk will come in in a few days  - monitor lochia  - encourage ambulation  - regular diet  - incision appropriate  - routine PP care  - anticipate discharge to home tomorrow    Heavenly Aguila MD  Text Page (8am - 5pm)  "

## 2021-03-31 NOTE — LACTATION NOTE
"Routine visit. The baby breastfeeding well per mother on the right breast without the shield and begins her feeds on the left breast with shield then after a few minutes mother states she can latch on with out it.  The 24mm flange is too small,  changed the flange to the 27mm and mother states \"feels much better\".  Discussed the two pumps we have and gave the comparison chart on the [employer]ra and Medela. Outpatient resource phone numbers given. Questions answered regarding pumping and physiology of milk supply and production. No further questions at this time. Reny Ferraro BSN, RN, PHN, RNC-MNN, IBCLC   "

## 2021-03-31 NOTE — CONSULTS
"[facility]  MATERNAL CHILD HEALTH   INITIAL PSYCHOSOCIAL ASSESSMENT     DATA:     Reason for Social Work Consult: PPD score    Presenting Information: JULIOCESAR gave birth to their second child at 36w4d. JULIOCESAR scored a 10 on her PPD scale.    Living Situation: JULIOCESAR and MIRIAM live together.     Social Support: MOB and FOB report being supported by their friend and MOB's mother.     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 ROBERTOB stated he is taking 4 days off.     Insurance: MOB and FOB report no insurance concerns.     Source of Financial Support: MIRIAM is employed     Mental Health History: MOB and FOB report no mental health history or concerns.     History of Postpartum Mood Disorders: JULIOCESAR reports no PPD with previous child.     Chemical Health History: N/A    Current Coping: JULIOCESAR has support from friends and family.     Community Resources//Baby Supplies: MOB report they do not have all supplies needed, but will be able to obtain them. MIRIAM plans to run to the store to get a pack and play/bassinet for baby to sleep in once they are discharged.     Other Information: JULIOCESAR stated that she is anxious because baby came early. She stated they were not ready and have nothing at home, except the car seat. JULIOCESAR stated that her mother is flying here from another country to help on April 7th. She stated that her mother would have been here for the delivery if baby had been on time. Writer gave MOB and FOB diapers and wipes to assist with the transition. MOB and FOB stated they they will feel less anxious when MOB's mother gets here and is able to help.     INTERVENTION:       BHARATHI completed chart review and collaborated with the multidisciplinary team.     Psychosocial Assessment     Introduction to Maternal Child Health  role and scope of practice     Provided \"Meeting Your Basic Needs While Your Child is Hospitalized\" hand out and SW business card     Reviewed Hospital and " Community Resources     Assessed Chemical Health History and Current Symptoms     Assessed Mental Health History and Current Symptoms     Identified stressors, barriers and family concerns     Provided support and active empathetic listening and validation.     Provided psychoeducation on  mood and anxiety disorders, assessed for any current symptoms or history    Provided brochure Depression and Anxiety During and after Pregnancy.     ASSESSMENT:     Coping: adequate    Affect: normal    Mood:  calm    Motivation/Ability to Access Services: independent in accessing services.    Assessment of Support System: supportive, involved.    Level of engagement with SW: They appeared open to and appreciative of ongoing therapeutic support, advocacy, and connection with resources. Engaged and appropriate. Able to seek out SW when needs arise.     Family and parent/infant interactions: Parents seem supportive of each other and are bonding with baby.    Assessment of parental risk for PMAD: Higher than average risk given early delivery.     Strengths: caring family    Vulnerabilities:  MOB and FOB stated they were not prepared for baby due to early delivery.     Identified Barriers: None at this time     PLAN:     SW will continue to follow throughout pt's Maternal-Child Health Journey as needs arise. SW will continue to collaborate with the multidisciplinary team.    MIKE Blackburn

## 2021-04-01 VITALS
WEIGHT: 171 LBS | HEART RATE: 80 BPM | SYSTOLIC BLOOD PRESSURE: 103 MMHG | DIASTOLIC BLOOD PRESSURE: 73 MMHG | BODY MASS INDEX: 29.19 KG/M2 | TEMPERATURE: 97.4 F | HEIGHT: 64 IN | OXYGEN SATURATION: 99 % | RESPIRATION RATE: 16 BRPM

## 2021-04-01 PROCEDURE — 250N000013 HC RX MED GY IP 250 OP 250 PS 637: Performed by: OBSTETRICS & GYNECOLOGY

## 2021-04-01 RX ORDER — SIMETHICONE 80 MG
80 TABLET,CHEWABLE ORAL EVERY 6 HOURS PRN
Qty: 60 TABLET | Refills: 0 | Status: SHIPPED | OUTPATIENT
Start: 2021-04-01

## 2021-04-01 RX ORDER — OXYCODONE HYDROCHLORIDE 5 MG/1
5 TABLET ORAL EVERY 4 HOURS PRN
Qty: 15 TABLET | Refills: 0 | Status: SHIPPED | OUTPATIENT
Start: 2021-04-01

## 2021-04-01 RX ORDER — IBUPROFEN 800 MG/1
800 TABLET, FILM COATED ORAL EVERY 6 HOURS
Qty: 60 TABLET | Refills: 0 | Status: SHIPPED | OUTPATIENT
Start: 2021-04-01

## 2021-04-01 RX ORDER — ACETAMINOPHEN 325 MG/1
975 TABLET ORAL EVERY 6 HOURS
Qty: 100 TABLET | Refills: 0 | Status: SHIPPED | OUTPATIENT
Start: 2021-04-01

## 2021-04-01 RX ADMIN — LEVOTHYROXINE SODIUM 137 MCG: 137 TABLET ORAL at 05:59

## 2021-04-01 RX ADMIN — ACETAMINOPHEN 975 MG: 325 TABLET, FILM COATED ORAL at 02:21

## 2021-04-01 RX ADMIN — SENNOSIDES AND DOCUSATE SODIUM 1 TABLET: 8.6; 5 TABLET ORAL at 08:18

## 2021-04-01 RX ADMIN — OXYCODONE HYDROCHLORIDE 5 MG: 5 TABLET ORAL at 02:33

## 2021-04-01 RX ADMIN — ACETAMINOPHEN 975 MG: 325 TABLET, FILM COATED ORAL at 08:17

## 2021-04-01 RX ADMIN — IBUPROFEN 800 MG: 400 TABLET ORAL at 05:59

## 2021-04-01 NOTE — PLAN OF CARE
Vital signs stable. Incision well approximated, adhesive strips intact.Voiding without difficulty. Lung sounds clear and equal. Using  tylenol, ibuprofen and oxycodone for pain management. Up ambulating free of dizziness. Working on breastfeeding every 2-3 hours with nipple shield and supplementing with EBM/similac via bottle. Pumping after feedings. Encouraged to call with questions/concerns. Will continue to monitor.

## 2021-04-01 NOTE — PLAN OF CARE
Doing well,vss,voiding with out difficulty,pain control with tylenol,ibuprofen&oxycodone,incision intact with steri strips,baby breast feeding with nipple shield,pumping suppleneting EBM&formula with bottle.plan to discharge today&follow up in 2 weeks or sooner with any concerns.

## 2021-04-01 NOTE — PROGRESS NOTES
"Valerie Burgess  2021   POD3 s/p RLTCS    S: 31 year old  delivered at 36w4d   Doing well without complaints.  Sore but medication helping.   Lochia minimal.   Ambulating.  Urinating without issues.  Passing flatus.  Breastfeeding/pumping and supplementing with formula.  Worried about baby being born early and losing weight.    O: /65   Pulse 80   Temp 97.9  F (36.6  C) (Oral)   Resp 16   Ht 1.626 m (5' 4\")   Wt 77.6 kg (171 lb)   LMP 2020   SpO2 99%   Breastfeeding Unknown   BMI 29.35 kg/m    Gen: NAD  Abd: soft, NT, ND, FF below U  Incision: c/d/i with subQ suture and steristrips  Ext: NT, nonedematous    Hemoglobin   Date Value Ref Range Status   2021 9.9 (L) 11.7 - 15.7 g/dL Final   ]    A/P:  31 year old  POD3 s/p RLTCS at 36+4 after presenting in PTL  - ibuprofen,Tylenol, and oxycodone PRN pain  - support breastfeeding/pumping and supplementing - advised that it's typical for all babies to lose a little weight initially in the first few days of life, encouraged feeding and pumping efforts   - monitor lochia  - encourage ambulation  - regular diet  - incision appropriate  - routine PP care  - stable for discharge to home today    Heavenly Aguila MD  Text Page (8am - 5pm)  "

## 2021-04-01 NOTE — LACTATION NOTE
Routine visit with Valerie, MIRIAM and baby girl.  Baby breastfeeding well with shield and then mother pumping.  Mother yielded 20ml with last pump session.  Getting ready for discharge.  Plan: Watch for feeding cues and feed every 2-3 hours and/or on demand. Continue to use feeding log to track intake and appropriate voids and stools. Take feeding log to first follow up appointment or weight check. Encourage skin to skin to promote frequent feedings, thermoregulation and bonding. Follow-up with healthcare provider or lactation consultant for questions or concerns.     Instructed on signs/symptoms of engorgement/ plugged ducts and mastitis.  Instructed on comfort measures and when to call MD.  No further questions at this time. Will follow as needed. Reny Ferraro BSN, RN, PHN, RNC-MNN, IBCLC

## 2021-04-01 NOTE — DISCHARGE INSTRUCTIONS
Please call the office if you experience  - bleeding through more than a pad per hour persistently  - passage of blood clots greater than the size of kiki  - abnormal vaginal discharge  - temperature greater than 100.4  - inability to tolerate food or fluid  - pain not controlled with oral medications  - persistent headache, vision changes, chest pain, shortness of breath, pain in the upper belly  - significant breast pain  - mood changes that affect your quality of life    Please make an appointment at [facility] in 2 weeks for an incision check and in 6 weeks for a postpartum visit.     Postop  Birth Instructions    Activity       Do not lift more than 10 pounds for 6 weeks after surgery.  Ask family and friends for help when you need it.    No driving until you have stopped taking your pain medications (usually two weeks after surgery).    No heavy exercise or activity for 6 weeks.  Don't do anything that will put a strain on your surgery site.    Don't strain when using the toilet.  Your care team may prescribe a stool softener if you have problems with your bowel movements.     To care for your incision:       Keep the incision clean and dry.    Do not soak your incision in water. No swimming or hot tubs until it has fully healed. You may soak in the bathtub if the water level is below your incision.    Do not use peroxide, gel, cream, lotion, or ointment on your incision.    Adjust your clothes to avoid pressure on your surgery site (check the elastic in your underwear for example).     You may see a small amount of clear or pink drainage and this is normal.  Check with your health care provider:       If the drainage increases or has an odor.    If the incision reddens, you have swelling, or develop a rash.    If you have increased pain and the medicine we prescribed doesn't help.    If you have a fever above 100.4 F (38 C) with or without chills when placing thermometer under your tongue.   The  area around your incision (surgery wound), will feel numb.  This is normal. The numbness should go away in less than a year.     Keep your hands clean:  Always wash your hands before touching your incision (surgery wound). This helps reduce your risk of infection. If your hands aren't dirty, you may use an alcohol hand-rub to clean your hands. Keep your nails clean and short.    Call your healthcare provider if you have any of these symptoms:       You soak a sanitary pad with blood within 1 hour, or you see blood clots larger than a golf ball.    Bleeding that lasts more than 6 weeks.    Vaginal discharge that smells bad.    Severe pain, cramping or tenderness in your lower belly area.    A need to urinate more frequently (use the toilet more often), more urgently (use the toilet very quickly), or it burns when you urinate.    Nausea and vomiting.    Redness, swelling or pain around a vein in your leg.    Problems breastfeeding or a red or painful area on your breast.    Chest pain and cough or are gasping for air.    Problems with coping with sadness, anxiety or depression. If you have concerns about hurting yourself or the baby, call your provider immediately.      You have questions or concerns after you return home.

## 2021-04-05 NOTE — DISCHARGE SUMMARY
[facility]    Discharge Summary  Obstetrics    Date of Admission:  3/29/2021  Date of Discharge:  2021  Discharging Provider: Heavenly Aguila    Discharge Diagnoses   PTL/PTD  S/p RLTCS    Procedure/Surgery Information   Procedure: Procedure(s):  REPEAT  SECTION   Surgeon(s): Surgeon(s) and Role:     * Heavenly Aguila MD - Primary     History of Present Illness   Valerie Burgess is a 31 year old  who presented @ 36+4 in PTL. She has a history of PLTCS. She underwent RLTCS - see operative report for full details. She delivered a viable female infant weighing 2710 g with Apgars of 8/8.     Hospital Course   The patient's hospital course was unremarkable.  She recovered as anticipated and experienced no post-operative complications. On discharge, her pain was well controlled. Vaginal bleeding appropriate.  Voiding without difficulty.  Ambulating well and tolerating a normal diet.  No fever or significant wound drainage.  Breastfeeding well.  Infant stable.  She was discharged on post-partum day 3.    Post-partum hemoglobin:   Hemoglobin   Date Value Ref Range Status   2021 9.9 (L) 11.7 - 15.7 g/dL Final       Heavenly Aguila MD      Discharge Disposition   Discharged to home   Condition at discharge: Stable      Unresulted Labs Ordered in the Past 30 Days of this Admission     No orders found from 2021 to 3/30/2021.          Primary Care Physician   No primary care provider on file.    Consultations This Hospital Stay   SOCIAL WORK IP CONSULT  HOME CARE POST PARTUM/ IP CONSULT  LACTATION IP CONSULT    Discharge Orders      Activity    Review discharge instructions     Reason for your hospital stay    Maternity care     Follow Up and recommended labs and tests    Please make an appointment at [facility] in 2 weeks for an incision check and in 6 weeks for a routine postpartum appointment     Discharge Instructions - Postpartum visit    Please make  an appointment at [facility] in 2 weeks for an incision check and in 6 weeks for a routine postpartum appointment     Diet    Resume previous diet     Discharge Medications   Discharge Medication List as of 4/1/2021 11:42 AM      START taking these medications    Details   acetaminophen (TYLENOL) 325 MG tablet Take 3 tablets (975 mg) by mouth every 6 hours, Disp-100 tablet, R-0, Local Print      ibuprofen (ADVIL/MOTRIN) 800 MG tablet Take 1 tablet (800 mg) by mouth every 6 hours, Disp-60 tablet, R-0, Local Print      oxyCODONE (ROXICODONE) 5 MG tablet Take 1 tablet (5 mg) by mouth every 4 hours as needed for severe pain, Disp-15 tablet, R-0, Local Print      simethicone (MYLICON) 80 MG chewable tablet Take 1 tablet (80 mg) by mouth every 6 hours as needed for flatulence or cramping, Disp-60 tablet, R-0, Local Print         CONTINUE these medications which have NOT CHANGED    Details   levothyroxine (SYNTHROID/LEVOTHROID) 137 MCG tablet Take 137 mcg by mouth daily , Historical           Allergies   No Known Allergies

## (undated) DEVICE — STPL SKIN PROXIMATE 35 WIDE PMW35

## (undated) DEVICE — PREP CHLORAPREP 26ML TINTED ORANGE  260815

## (undated) DEVICE — DRAPE SHEET REV FOLD 3/4 9349

## (undated) DEVICE — PAD CHUX UNDERPAD 23X24" 7136

## (undated) DEVICE — DRSG KERLIX FLUFFS X5

## (undated) DEVICE — PACK C-SECTION LF PL15OTA83B

## (undated) DEVICE — DRSG TELFA ISLAND 4X10"

## (undated) DEVICE — LIGHT HANDLE X2

## (undated) DEVICE — BLADE CLIPPER 4406

## (undated) DEVICE — SUCTION CANISTER MEDIVAC LINER 3000ML W/LID 65651-530

## (undated) DEVICE — LINEN TOWEL PACK X5 5464

## (undated) DEVICE — ESU GROUND PAD UNIVERSAL W/O CORD

## (undated) DEVICE — PACK SET-UP STD 9102

## (undated) DEVICE — SOL WATER IRRIG 1000ML BOTTLE 07139-09

## (undated) DEVICE — SOL NACL 0.9% IRRIG 1000ML BOTTLE 07138-09

## (undated) DEVICE — LINEN BABY BLANKET 5434

## (undated) RX ORDER — MORPHINE SULFATE 1 MG/ML
INJECTION, SOLUTION EPIDURAL; INTRATHECAL; INTRAVENOUS
Status: DISPENSED
Start: 2021-03-29

## (undated) RX ORDER — ONDANSETRON 2 MG/ML
INJECTION INTRAMUSCULAR; INTRAVENOUS
Status: DISPENSED
Start: 2021-03-29

## (undated) RX ORDER — OXYTOCIN/0.9 % SODIUM CHLORIDE 30/500 ML
PLASTIC BAG, INJECTION (ML) INTRAVENOUS
Status: DISPENSED
Start: 2021-03-29